# Patient Record
Sex: MALE | Race: WHITE | Employment: OTHER | RURAL
[De-identification: names, ages, dates, MRNs, and addresses within clinical notes are randomized per-mention and may not be internally consistent; named-entity substitution may affect disease eponyms.]

---

## 2019-10-23 ENCOUNTER — OFFICE VISIT (OUTPATIENT)
Dept: SURGERY | Age: 84
End: 2019-10-23

## 2019-10-23 VITALS
DIASTOLIC BLOOD PRESSURE: 83 MMHG | BODY MASS INDEX: 26.24 KG/M2 | SYSTOLIC BLOOD PRESSURE: 154 MMHG | WEIGHT: 183.3 LBS | RESPIRATION RATE: 18 BRPM | HEIGHT: 70 IN | TEMPERATURE: 98.1 F | HEART RATE: 71 BPM | OXYGEN SATURATION: 98 %

## 2019-10-23 DIAGNOSIS — R13.19 ESOPHAGEAL DYSPHAGIA: Primary | ICD-10-CM

## 2019-10-23 RX ORDER — LOSARTAN POTASSIUM 50 MG/1
50 TABLET ORAL 2 TIMES DAILY
Refills: 3 | COMMUNITY
Start: 2019-09-14

## 2019-10-23 RX ORDER — SOLIFENACIN SUCCINATE 5 MG/1
5 TABLET, FILM COATED ORAL DAILY
COMMUNITY

## 2019-10-23 NOTE — PROGRESS NOTES
Linette Lal is a 81 y.o. male who presents today with the following:  Chief Complaint   Patient presents with    Dysphagia       HPI    55-year-old male who presents to us as a referral by Dr. Kuldeep Flynn for episode of dysphagia that occurred approximately 3 to 4 weeks ago. He states he was eating a salad and ate half a sliced tomato that he believes was an immature aroma tomato. It apparently got stuck down low in his esophagus. He ended up coughing up food and the episode lasted for slightly prolonged period of time but then resolved. He has had no further episodes of dysphagia since then. Any episodes of dysphagia prior to this. He occasionally has reflux but no active reflux over a significant period of time and is not on any medications for this. He stopped smoking 30 years ago. He does drink a few glasses of wine every day. He has had no unexpected weight loss. He has no abdominal pain. He is a retired . No past medical history on file. Past Surgical History:   Procedure Laterality Date    HX HERNIA REPAIR         Social History     Socioeconomic History    Marital status:      Spouse name: Not on file    Number of children: Not on file    Years of education: Not on file    Highest education level: Not on file   Occupational History    Not on file   Social Needs    Financial resource strain: Not on file    Food insecurity:     Worry: Not on file     Inability: Not on file    Transportation needs:     Medical: Not on file     Non-medical: Not on file   Tobacco Use    Smoking status: Former Smoker    Smokeless tobacco: Never Used   Substance and Sexual Activity    Alcohol use:  Yes    Drug use: Not on file    Sexual activity: Not on file   Lifestyle    Physical activity:     Days per week: Not on file     Minutes per session: Not on file    Stress: Not on file   Relationships    Social connections:     Talks on phone: Not on file     Gets together: Not on file Attends Yarsani service: Not on file     Active member of club or organization: Not on file     Attends meetings of clubs or organizations: Not on file     Relationship status: Not on file    Intimate partner violence:     Fear of current or ex partner: Not on file     Emotionally abused: Not on file     Physically abused: Not on file     Forced sexual activity: Not on file   Other Topics Concern    Not on file   Social History Narrative    Not on file       Family History   Problem Relation Age of Onset    Cancer Father        No Known Allergies    Current Outpatient Medications   Medication Sig    losartan (COZAAR) 50 mg tablet Take 50 mg by mouth two (2) times a day.  silodosin (RAPAFLO PO) Take  by mouth.  solifenacin (VESICARE) 5 mg tablet Take 5 mg by mouth daily.  vit C/E/Zn/coppr/lutein/zeaxan (PRESERVISION AREDS-2 PO) Take  by mouth. No current facility-administered medications for this visit. The above histories, medications and allergies have been reviewed. Review of Systems   HENT: Positive for congestion and hearing loss. Genitourinary: Positive for urgency. Skin: Positive for rash. Neurological: Positive for dizziness. Visit Vitals  /83 (BP 1 Location: Left arm, BP Patient Position: Sitting)   Pulse 71   Temp 98.1 °F (36.7 °C) (Oral)   Resp 18   Ht 5' 10\" (1.778 m)   Wt 183 lb 4.8 oz (83.1 kg)   SpO2 98%   BMI 26.30 kg/m²     Physical Exam   Constitutional: He is well-developed, well-nourished, and in no distress. Cardiovascular: Normal rate, regular rhythm and normal heart sounds. Pulmonary/Chest: Effort normal and breath sounds normal. No respiratory distress. Abdominal: Soft. He exhibits no distension and no mass. There is no tenderness. There is no rebound and no guarding. 1. Esophageal dysphagia  He has had a single episode which has resolved. We discussed the options. I do think he would benefit from further evaluation.   I have offered both upper GI and barium swallow versus EGD to start with. Benefits and risks of both procedures were explained as well as limitations. He has elected to start with the upper GI which I think is reasonable. We will see him back after this exam in the proceed. Follow-up and Dispositions    · Return for after testing.          Alexandria Kline MD

## 2019-10-23 NOTE — PROGRESS NOTES
1. Have you been to the ER, urgent care clinic since your last visit? Hospitalized since your last visit? No    2. Have you seen or consulted any other health care providers outside of the [address] [place] [address] since your last visit? Include any pap smears or colon screening.  Yes When: new pt, [facility] intern[facility]

## 2019-10-23 NOTE — PATIENT INSTRUCTIONS
Learning About the Diet for Swallowing Problems  What are swallowing problems? Difficulty swallowing is also called dysphagia (say \"jqa-SHZ-syb-uh\"). It is most often a sign of a problem with your throat or esophagus. This is the tube that moves food and liquids from the back of your mouth to your stomach. Trouble swallowing can occur when the muscles and nerves that move food through the throat and esophagus are not working right. To help you swallow food, your doctor or speech therapist may advise a special dysphagia diet for you. Why is a special diet important? A dysphagia diet can help you handle some problems that can occur when it's hard to swallow food and liquids easily. These problems can include:  · Malnutrition. This means you aren't getting enough healthy foods to keep your body working well. · Dehydration. This means you aren't getting enough liquids to keep your body healthy. · Aspiration. This means that food, liquid, or saliva goes down your windpipe (trachea) into your lungs, instead of down your esophagus to your stomach. This can lead to aspiration pneumonia, which is an inflammation of the lungs. What is the dysphagia diet? In the dysphagia diet, you change the foods you eat and the liquids you drink to make it easier to swallow them. You may:  · Change the texture of the foods you eat. Your doctor or speech therapist may advise you to eat one of these types of foods:  ? Solid, soft foods that have been cut up into small pieces. Extra gravy or sauce can help make these foods easier to swallow. ? Semi-solid foods, like ground meats or fruits and vegetables that are mashed with a fork. These foods require some chewing. Extra gravy or sauce is often served. ? Foods that are the texture of pudding. You don't have to chew these foods. Examples include mashed potatoes with gravy; yogurt; pudding; and pureed meats, fruits, and vegetables. · Thicken the liquids you drink.  Your doctor or speech therapist will tell you what kind of thickener to use and how thick to make the liquids. ? Nectar-thick liquids leave a thin coating when they are poured from a spoon. ? Honey-thick liquids drip slowly when they are poured from a spoon. ? Pudding-thick liquids do not pour from a spoon. Your speech therapist will help you learn exercises to train your muscles to work together so you can swallow. You may also need to learn how to position your body or how to put food in your mouth to be able to swallow better. Some people have severe trouble swallowing and can't get enough food and liquids. In those cases, the doctor can insert a feeding tube so the person gets enough nutrients. Follow-up care is a key part of your treatment and safety. Be sure to make and go to all appointments, and call your doctor if you are having problems. It's also a good idea to know your test results and keep a list of the medicines you take. Where can you learn more? Go to http://yoly-mckenna.info/. Enter A794 in the search box to learn more about \"Learning About the Diet for Swallowing Problems. \"  Current as of: June 26, 2019  Content Version: 12.2  © 6863-8922 [employer]. Care instructions adapted under license by [employer] (which disclaims liability or warranty for this information). If you have questions about a medical condition or this instruction, always ask your healthcare professional. [place] [address] any warranty or liability for your use of this information.

## 2019-11-13 ENCOUNTER — OFFICE VISIT (OUTPATIENT)
Dept: SURGERY | Age: 84
End: 2019-11-13

## 2019-11-13 VITALS
OXYGEN SATURATION: 99 % | RESPIRATION RATE: 16 BRPM | SYSTOLIC BLOOD PRESSURE: 122 MMHG | HEIGHT: 70 IN | DIASTOLIC BLOOD PRESSURE: 80 MMHG | BODY MASS INDEX: 26.2 KG/M2 | WEIGHT: 183 LBS | HEART RATE: 76 BPM

## 2019-11-13 DIAGNOSIS — R13.19 ESOPHAGEAL DYSPHAGIA: Primary | ICD-10-CM

## 2019-11-13 NOTE — PROGRESS NOTES
Rebecca Blanco is a 81 y.o. male who presents today with the following:  Chief Complaint   Patient presents with    Results       HPI    He presents in follow-up after his upper GI. He has been doing well and has had no further episodes of problems. His upper GI showed the presence of a sliding hiatal hernia and no evidence of stricture or mucosal abnormality or mass lesion or peptic ulcer disease. He has not been on any medication for GI symptoms. History reviewed. No pertinent past medical history. Past Surgical History:   Procedure Laterality Date    HX HERNIA REPAIR         Social History     Socioeconomic History    Marital status:      Spouse name: Not on file    Number of children: Not on file    Years of education: Not on file    Highest education level: Not on file   Occupational History    Not on file   Social Needs    Financial resource strain: Not on file    Food insecurity:     Worry: Not on file     Inability: Not on file    Transportation needs:     Medical: Not on file     Non-medical: Not on file   Tobacco Use    Smoking status: Former Smoker    Smokeless tobacco: Never Used   Substance and Sexual Activity    Alcohol use:  Yes    Drug use: Not on file    Sexual activity: Not on file   Lifestyle    Physical activity:     Days per week: Not on file     Minutes per session: Not on file    Stress: Not on file   Relationships    Social connections:     Talks on phone: Not on file     Gets together: Not on file     Attends Jainism service: Not on file     Active member of club or organization: Not on file     Attends meetings of clubs or organizations: Not on file     Relationship status: Not on file    Intimate partner violence:     Fear of current or ex partner: Not on file     Emotionally abused: Not on file     Physically abused: Not on file     Forced sexual activity: Not on file   Other Topics Concern    Not on file   Social History Narrative    Not on file Family History   Problem Relation Age of Onset    Cancer Father        No Known Allergies    Current Outpatient Medications   Medication Sig    losartan (COZAAR) 50 mg tablet Take 50 mg by mouth two (2) times a day.  silodosin (RAPAFLO PO) Take  by mouth.  solifenacin (VESICARE) 5 mg tablet Take 5 mg by mouth daily.  vit C/E/Zn/coppr/lutein/zeaxan (PRESERVISION AREDS-2 PO) Take  by mouth. No current facility-administered medications for this visit. The above histories, medications and allergies have been reviewed. ROS    Visit Vitals  /80 (BP 1 Location: Left arm, BP Patient Position: Sitting)   Pulse 76   Resp 16   Ht 5' 10\" (1.778 m)   Wt 183 lb (83 kg)   SpO2 99%   BMI 26.26 kg/m²     Physical Exam   Constitutional: He is well-developed, well-nourished, and in no distress. Upper GI series with KUB dated 11/4/2019     Total number of images obtained: 14.     Total fluoroscopy time: 0.6 minutes.     Radiation dose: 25.21 mGy     The preliminary  film was unremarkable.     The patient drank Citrocarbonate and barium suspension without difficulty. The  esophagus demonstrated normal motility with no evidence of mucosal abnormality,  stricture or mass lesion. A sliding hiatal hernia was noted. No gastroesophageal  reflux was demonstrated at the time of this examination. The stomach revealed no  evidence of mass or ulcer. The duodenal bulb distended normally and exhibited no  evidence of peptic ulcer disease. The duodenum and visualized proximal small  bowel are unremarkable.     IMPRESSION  IMPRESSION:  1. Presence of a sliding hiatal hernia. No gastroesophageal reflux demonstrated  at the time of this examination. 2. No evidence of active peptic ulcer disease. 1. Esophageal dysphagia  Isolated episode of dysphagia. No significant pathology seen on upper GI and the patient has been completely asymptomatic. No further work-up needed at this point.   Follow-up if any symptoms recur. Follow-up and Dispositions    · Return for for any new problems.          Josafat Stinson MD

## 2019-11-13 NOTE — PATIENT INSTRUCTIONS
Hiatal Hernia: Care Instructions  Your Care Instructions  A hiatal hernia occurs when part of the stomach bulges into the chest cavity. A hiatal hernia may allow stomach acid and juices to back up into the esophagus (acid reflux). This can cause a feeling of burning, warmth, heat, or pain behind the breastbone. This feeling may often occur after you eat, soon after you lie down, or when you bend forward, and it may come and go. You also may have a sour taste in your mouth. These symptoms are commonly known as heartburn or reflux. But not all hiatal hernias cause symptoms. Follow-up care is a key part of your treatment and safety. Be sure to make and go to all appointments, and call your doctor if you are having problems. It's also a good idea to know your test results and keep a list of the medicines you take. How can you care for yourself at home? · Take your medicines exactly as prescribed. Call your doctor if you think you are having a problem with your medicine. · Do not take aspirin or other nonsteroidal anti-inflammatory drugs (NSAIDs), such as ibuprofen (Advil, Motrin) or naproxen (Aleve), unless your doctor says it is okay. Ask your doctor what you can take for pain. · Your doctor may recommend over-the-counter medicine. For mild or occasional indigestion, antacids such as Tums, Gaviscon, Maalox, or Mylanta may help. Your doctor also may recommend over-the-counter acid reducers, such as famotidine (Pepcid AC), cimetidine (Tagamet HB), ranitidine (Zantac 75 and Zantac 150), or omeprazole (Prilosec). Read and follow all instructions on the label. If you use these medicines often, talk with your doctor. · Change your eating habits. ? It's best to eat several small meals instead of two or three large meals. ? After you eat, wait 2 to 3 hours before you lie down. Late-night snacks aren't a good idea. ? Chocolate, mint, and alcohol can make heartburn worse.  They relax the valve between the esophagus and the stomach. ? Spicy foods, foods that have a lot of acid (like tomatoes and oranges), and coffee can make heartburn symptoms worse in some people. If your symptoms are worse after you eat a certain food, you may want to stop eating that food to see if your symptoms get better. · Do not smoke or chew tobacco.  · If you get heartburn at night, raise the head of your bed 6 to 8 inches by putting the frame on blocks or placing a foam wedge under the head of your mattress. (Adding extra pillows does not work.)  · Do not wear tight clothing around your middle. · Lose weight if you need to. Losing just 5 to 10 pounds can help. When should you call for help? Call your doctor now or seek immediate medical care if:    · You have new or worse belly pain.     · You are vomiting.    Watch closely for changes in your health, and be sure to contact your doctor if:    · You have new or worse symptoms of indigestion.     · You have trouble or pain swallowing.     · You are losing weight.     · You do not get better as expected. Where can you learn more? Go to http://yoly-mckenna.info/. Enter B608 in the search box to learn more about \"Hiatal Hernia: Care Instructions. \"  Current as of: November 7, 2018  Content Version: 12.2  © 9894-7607 [employer]. Care instructions adapted under license by [employer] (which disclaims liability or warranty for this information). If you have questions about a medical condition or this instruction, always ask your healthcare professional. [address] any warranty or liability for your use of this information.

## 2019-11-13 NOTE — PROGRESS NOTES
1. Have you been to the ER, urgent care clinic since your last visit? Hospitalized since your last visit? No    2. Have you seen or consulted any other health care providers outside of the [address] [place] [address] since your last visit? Include any pap smears or colon screening.  No

## 2020-07-27 ENCOUNTER — OFFICE VISIT (OUTPATIENT)
Dept: SURGERY | Age: 85
End: 2020-07-27

## 2020-07-27 VITALS
SYSTOLIC BLOOD PRESSURE: 165 MMHG | TEMPERATURE: 98.9 F | BODY MASS INDEX: 26.2 KG/M2 | DIASTOLIC BLOOD PRESSURE: 85 MMHG | HEIGHT: 70 IN | WEIGHT: 183 LBS | HEART RATE: 73 BPM

## 2020-07-27 DIAGNOSIS — R13.10 DYSPHAGIA, UNSPECIFIED TYPE: Primary | ICD-10-CM

## 2020-07-27 NOTE — PROGRESS NOTES
1. Have you been to the ER, urgent care clinic since your last visit? Hospitalized since your last visit? No    2. Have you seen or consulted any other health care providers outside of the [address] [place] [address] since your last visit? Include any pap smears or colon screening.  No

## 2020-07-27 NOTE — PROGRESS NOTES
En Yee is a 81 y.o. male who presents today with the following:  No chief complaint on file. HPI    25-year-old male who is known to me who had presented for dysphagia back he had November and had an upper GI which showed a sliding hiatal hernia and no active peptic ulcer disease with no evidence of mucosal abnormality or stricture or mass of the esophagus. He states that he has been having some worsening swallowing the pills was seen by his primary care physician Dr. Sepideh Lemons and referred over for possible EGD to evaluate for eosinophilic esophagitis. He states that he occasionally feels that food particles get stuck in his throat that he needs to eat a piece of bread to clear out and the most significant issue is with pills. No past medical history on file. Past Surgical History:   Procedure Laterality Date    HX HERNIA REPAIR         Social History     Socioeconomic History    Marital status:      Spouse name: Not on file    Number of children: Not on file    Years of education: Not on file    Highest education level: Not on file   Occupational History    Not on file   Social Needs    Financial resource strain: Not on file    Food insecurity     Worry: Not on file     Inability: Not on file    Transportation needs     Medical: Not on file     Non-medical: Not on file   Tobacco Use    Smoking status: Former Smoker    Smokeless tobacco: Never Used   Substance and Sexual Activity    Alcohol use:  Yes    Drug use: Not on file    Sexual activity: Not on file   Lifestyle    Physical activity     Days per week: Not on file     Minutes per session: Not on file    Stress: Not on file   Relationships    Social connections     Talks on phone: Not on file     Gets together: Not on file     Attends Latter day service: Not on file     Active member of club or organization: Not on file     Attends meetings of clubs or organizations: Not on file     Relationship status: Not on file   Maricarmen Coburn Intimate partner violence     Fear of current or ex partner: Not on file     Emotionally abused: Not on file     Physically abused: Not on file     Forced sexual activity: Not on file   Other Topics Concern    Not on file   Social History Narrative    Not on file       Family History   Problem Relation Age of Onset    Cancer Father        No Known Allergies    Current Outpatient Medications   Medication Sig    losartan (COZAAR) 50 mg tablet Take 50 mg by mouth two (2) times a day.  silodosin (RAPAFLO PO) Take  by mouth.  solifenacin (VESICARE) 5 mg tablet Take 5 mg by mouth daily.  vit C/E/Zn/coppr/lutein/zeaxan (PRESERVISION AREDS-2 PO) Take  by mouth. No current facility-administered medications for this visit. The above histories, medications and allergies have been reviewed. ROS    Visit Vitals  /85 (BP 1 Location: Left arm, BP Patient Position: Sitting)   Pulse 73   Temp 98.9 °F (37.2 °C) (Temporal)   Ht 5' 10\" (1.778 m)   Wt 183 lb (83 kg)   BMI 26.26 kg/m²     Physical Exam  Cardiovascular:      Rate and Rhythm: Normal rate and regular rhythm. Pulses: Normal pulses. Heart sounds: Normal heart sounds. Pulmonary:      Effort: Pulmonary effort is normal.      Breath sounds: Normal breath sounds. Abdominal:      General: There is no distension. Palpations: Abdomen is soft. There is no mass. Tenderness: There is no abdominal tenderness. Neurological:      Mental Status: He is alert. 1. Dysphagia, unspecified type  I offered him EGD because I think it is reasonable to evaluate for eosinophilic esophagitis. He is hesitant to proceed with this in light of the current Covid 19 crisis as well as the fact that he feels that his symptoms are not significantly severe. He is aware that without doing an EGD we cannot tell him if this disease process is present.   We have agreed that if his symptoms become worse or if the COVID-19 situation stabilizes he would call to schedule for EGD. Follow-up PRN. Follow-up and Dispositions    · Return if symptoms worsen or fail to improve.          Anny Gillis MD

## 2020-07-27 NOTE — PATIENT INSTRUCTIONS
Learning About the Diet for Swallowing Problems  What are swallowing problems? Difficulty swallowing is also called dysphagia (say \"egc-LIY-gwz-uh\"). It is most often a sign of a problem with your throat or esophagus. This is the tube that moves food and liquids from the back of your mouth to your stomach. Trouble swallowing can occur when the muscles and nerves that move food through the throat and esophagus are not working right. To help you swallow food, your doctor or speech therapist may advise a special dysphagia diet for you. Why is a special diet important? A dysphagia diet can help you handle some problems that can occur when it's hard to swallow food and liquids easily. These problems can include:  · Malnutrition. This means you aren't getting enough healthy foods to keep your body working well. · Dehydration. This means you aren't getting enough liquids to keep your body healthy. · Aspiration. This means that food, liquid, or saliva goes down your windpipe (trachea) into your lungs, instead of down your esophagus to your stomach. This can lead to aspiration pneumonia, which is an inflammation of the lungs. What is the dysphagia diet? In the dysphagia diet, you change the foods you eat and the liquids you drink to make it easier to swallow them. You may:  · Change the texture of the foods you eat. Your doctor or speech therapist may advise you to eat one of these types of foods:  ? Solid, soft foods that have been cut up into small pieces. Extra gravy or sauce can help make these foods easier to swallow. ? Semi-solid foods, like ground meats or fruits and vegetables that are mashed with a fork. These foods require some chewing. Extra gravy or sauce is often served. ? Foods that are the texture of pudding. You don't have to chew these foods. Examples include mashed potatoes with gravy; yogurt; pudding; and pureed meats, fruits, and vegetables. · Thicken the liquids you drink.  Your doctor or speech therapist will tell you what kind of thickener to use and how thick to make the liquids. ? Nectar-thick liquids leave a thin coating when they are poured from a spoon. ? Honey-thick liquids drip slowly when they are poured from a spoon. ? Pudding-thick liquids do not pour from a spoon. Your speech therapist will help you learn exercises to train your muscles to work together so you can swallow. You may also need to learn how to position your body or how to put food in your mouth to be able to swallow better. Some people have severe trouble swallowing and can't get enough food and liquids. In those cases, the doctor can insert a feeding tube so the person gets enough nutrients. Follow-up care is a key part of your treatment and safety. Be sure to make and go to all appointments, and call your doctor if you are having problems. It's also a good idea to know your test results and keep a list of the medicines you take. Where can you learn more? Go to http://yoly-mckenna.info/  Enter J838 in the search box to learn more about \"Learning About the Diet for Swallowing Problems. \"  Current as of: June 26, 2019               Content Version: 12.5  © 0290-0600 [employer]. Care instructions adapted under license by [employer] (which disclaims liability or warranty for this information). If you have questions about a medical condition or this instruction, always ask your healthcare professional. [place] [address] any warranty or liability for your use of this information.

## 2020-08-05 ENCOUNTER — OFFICE VISIT (OUTPATIENT)
Dept: SURGERY | Age: 85
End: 2020-08-05

## 2020-08-05 VITALS
HEIGHT: 70 IN | BODY MASS INDEX: 26.2 KG/M2 | OXYGEN SATURATION: 99 % | DIASTOLIC BLOOD PRESSURE: 65 MMHG | TEMPERATURE: 96.9 F | HEART RATE: 50 BPM | RESPIRATION RATE: 16 BRPM | SYSTOLIC BLOOD PRESSURE: 153 MMHG | WEIGHT: 183 LBS

## 2020-08-05 DIAGNOSIS — R13.19 ESOPHAGEAL DYSPHAGIA: Primary | ICD-10-CM

## 2020-08-05 NOTE — PROGRESS NOTES
1. Have you been to the ER, urgent care clinic since your last visit? Hospitalized since your last visit? No    2. Have you seen or consulted any other health care providers outside of the [address] [place] [address] since your last visit? Include any pap smears or colon screening.  No

## 2020-08-05 NOTE — PROGRESS NOTES
Carl Silva is a 81 y.o. male who presents today with the following:  Chief Complaint   Patient presents with    Follow-up       HPI    60-year-old male who is known to me who had presented for dysphagia back he had November and had an upper GI which showed a sliding hiatal hernia and no active peptic ulcer disease with no evidence of mucosal abnormality or stricture or mass of the esophagus. He states that he has been having some worsening swallowing the pills was seen by his primary care physician Dr. Yevgeniy Fuchs and referred over for possible EGD to evaluate for eosinophilic esophagitis. He states that he occasionally feels that food particles get stuck in his throat that he needs to eat a piece of bread to clear out and the most significant issue is with pills. Since last seen he has had an additional episode after eating roast beef that resulted in nausea and vomiting and difficulty swallowing and he is at this point prepared to proceed with EGD. History reviewed. No pertinent past medical history. Past Surgical History:   Procedure Laterality Date    HX HERNIA REPAIR         Social History     Socioeconomic History    Marital status:      Spouse name: Not on file    Number of children: Not on file    Years of education: Not on file    Highest education level: Not on file   Occupational History    Not on file   Social Needs    Financial resource strain: Not on file    Food insecurity     Worry: Not on file     Inability: Not on file    Transportation needs     Medical: Not on file     Non-medical: Not on file   Tobacco Use    Smoking status: Former Smoker    Smokeless tobacco: Never Used   Substance and Sexual Activity    Alcohol use:  Yes    Drug use: Not on file    Sexual activity: Not on file   Lifestyle    Physical activity     Days per week: Not on file     Minutes per session: Not on file    Stress: Not on file   Relationships    Social connections     Talks on phone: Not on file     Gets together: Not on file     Attends Advent service: Not on file     Active member of club or organization: Not on file     Attends meetings of clubs or organizations: Not on file     Relationship status: Not on file    Intimate partner violence     Fear of current or ex partner: Not on file     Emotionally abused: Not on file     Physically abused: Not on file     Forced sexual activity: Not on file   Other Topics Concern    Not on file   Social History Narrative    Not on file       Family History   Problem Relation Age of Onset    Cancer Father        No Known Allergies    Current Outpatient Medications   Medication Sig    losartan (COZAAR) 50 mg tablet Take 50 mg by mouth two (2) times a day.  silodosin (RAPAFLO PO) Take  by mouth.  solifenacin (VESICARE) 5 mg tablet Take 5 mg by mouth daily.  vit C/E/Zn/coppr/lutein/zeaxan (PRESERVISION AREDS-2 PO) Take  by mouth. No current facility-administered medications for this visit. The above histories, medications and allergies have been reviewed. ROS    Visit Vitals  /65 (BP 1 Location: Left arm, BP Patient Position: Sitting)   Pulse (!) 50   Temp 96.9 °F (36.1 °C) (Temporal)   Resp 16   Ht 5' 10\" (1.778 m)   Wt 183 lb (83 kg)   SpO2 99%   BMI 26.26 kg/m²     Physical Exam  Constitutional:       Appearance: Normal appearance. Cardiovascular:      Rate and Rhythm: Normal rate and regular rhythm. Pulmonary:      Effort: Pulmonary effort is normal.      Breath sounds: Normal breath sounds. Abdominal:      General: There is no distension. Palpations: Abdomen is soft. There is no mass. Tenderness: There is no abdominal tenderness. Neurological:      Mental Status: He is alert. 1. Esophageal dysphagia  Recommend EGD. Risks of the procedure were shared with the patient including the risks of aspiration or esophageal or gastric perforation. All questions were answered to the patient's satisfaction. We will schedule. The patient was counseled at length about the risks of carter Covid-19 during their perioperative period and any recovery window from their procedure. The patient was made aware that carter Covid-19  may worsen their prognosis for recovering from their procedure and lend to a higher morbidity and/or mortality risk. All material risks, benefits, and reasonable alternatives including postponing the procedure were discussed. The patient does  wish to proceed with the procedure at this time. Follow-up and Dispositions    · Return for post procedure.          Sofya Spivey MD

## 2020-08-25 ENCOUNTER — OFFICE VISIT (OUTPATIENT)
Dept: SURGERY | Age: 85
End: 2020-08-25

## 2020-08-25 VITALS
WEIGHT: 183 LBS | DIASTOLIC BLOOD PRESSURE: 98 MMHG | SYSTOLIC BLOOD PRESSURE: 148 MMHG | TEMPERATURE: 97.1 F | HEIGHT: 70 IN | HEART RATE: 70 BPM | BODY MASS INDEX: 26.2 KG/M2

## 2020-08-25 DIAGNOSIS — C15.9 MALIGNANT NEOPLASM OF ESOPHAGUS, UNSPECIFIED LOCATION (HCC): Primary | ICD-10-CM

## 2020-08-25 NOTE — LETTER
8/25/2020 11:07 AM 
 
Patient:  Abbie Fitzpatrick YOB: 1935 Date of Visit: 8/25/2020 Dear Katia Jimenez MD 
[place] [id number] VIA Facsimile: [phone]: Thank you for referring Mr. Abbie Fitzpatrick to me for evaluation/treatment. Below are the relevant portions of my assessment and plan of care. As we had discussed by phone he had underwent EGD on August 10. Biopsy did show a squamous cell carcinoma and an ulcer at 29 cm from the incisor. I saw him back in the office today and shared these findings with him and his spouse. He is agreeable to further work-up by cardiothoracic surgery and we will arrange for this referral. 
 
 
 
If you have questions, please do not hesitate to call me. I look forward to following Mr. Fiordaliza Chan along with you.  
 
 
 
Sincerely, 
 
 
Mateo Esteves MD

## 2020-08-25 NOTE — PROGRESS NOTES
[address] [facility]      Clinic Note - Follow up    Subjective     Kei Baldwin returns for scheduled follow up today. He is status post EGD performed back on August 10. He was found to have an ulcer at approximately 29 cm. This measured approximately 2 to 3 cm. Biopsies of this lesion showed this to be a squamous cell carcinoma which was moderately differentiated. We shared these findings with him and his spouse today in the office. Objective     Visit Vitals  BP (!) 148/98 (BP 1 Location: Left arm, BP Patient Position: Sitting)   Pulse 70   Temp 97.1 °F (36.2 °C) (Temporal)   Ht 5' 10\" (1.778 m)   Wt 183 lb (83 kg)   BMI 26.26 kg/m²         PE  GEN - Awake, alert, communicating appropriately. NAD    Labs     FINAL PATHOLOGIC DIAGNOSIS   Esophagus, esophageal ulcer, biopsy:   Positive for malignancy. Invasive moderately differentiated squamous cell carcinoma. Assessment     Kei Baldwin is a 81 y. o.yr old male with biopsy-proven squamous cell carcinoma at approximately 29 cm from the incisors. Plan     Had a lengthy discussion with the patient. He agrees for further evaluation and potential treatment. We will arrange for referral to cardiothoracic surgery.     Anny Gillis MD    CC: Dr. Agustin Jimenez

## 2020-08-25 NOTE — PATIENT INSTRUCTIONS
Learning About Esophageal Cancer  What is it? Esophageal cancer is the growth of abnormal cells in your esophagus. That's the hollow tube that connects your throat to your stomach. The cancer most often starts in the cells that line the inside of the esophagus. What are the symptoms? Symptoms of this type of cancer can include:  · Trouble swallowing. · Feeling like there's something stuck in your throat. · Pain when you swallow. · Weight loss. · Pain behind your breastbone (sternum). · Hoarseness and coughing. · Stomach upset (indigestion) and heartburn. How is it diagnosed? Your doctor will ask you questions about your and your family's past health. He or she will do a physical exam.  Your doctor likely will do an endoscopy. This is a test that lets your doctor look at the inside of your esophagus. The doctor uses a thin, lighted tube (endoscope) that bends. He or she uses it to look at your stomach and the first part of your small intestine. The doctor can also use the scope to take a sample of tissue for study (a biopsy). You may need more tests. These tests may include a CT scan, a PET scan, or an ultrasound of your esophagus. How is it treated? Treatment for esophageal cancer is based on the stage of the cancer and other things, such as your overall health. The main treatment options include:  Endoscopic treatment. For early cancer, the doctor may use an endoscope to guide tools down your throat. The tools are used to remove or destroy the cancer. Surgery. For cancer deeper in the tissue, you may have surgery to remove part of the esophagus. Radiation therapy. This uses high-dose X-rays to destroy cancer cells and shrink tumors. Chemotherapy. These medicines kill fast-growing cells, including cancer cells and some normal cells. Chemotherapy and radiation may be given together. This is called chemoradiation.   Your doctor will talk with you about your options and then make a treatment plan. What are some other things to think about? · Some tumors are aggressive and need treatment right away. But most cancer grows slowly enough that you can take a little time to find out more about your cancer as you decide about treatment. · Think about getting a second opinion from another doctor. Before you start major treatment, it's a good idea to check with another doctor about the type of cancer you have and the stage of your cancer. Your doctor or insurance company can recommend someone for a second opinion. · Ask any questions you might have. You can talk to your doctor, nurses, counselors, and other advisors. · Talk to family, friends, and supporters. Get the kinds of help you need. Follow-up care is a key part of your treatment and safety. Be sure to make and go to all appointments, and call your doctor if you are having problems. It's also a good idea to know your test results and keep a list of the medicines you take. Where can you learn more? Go to http://yoly-mckenna.info/  Enter E145 in the search box to learn more about \"Learning About Esophageal Cancer. \"  Current as of: August 22, 2019               Content Version: 12.5  © 8086-7436 [employer]. Care instructions adapted under license by [employer] (which disclaims liability or warranty for this information). If you have questions about a medical condition or this instruction, always ask your healthcare professional. [address] any warranty or liability for your use of this information.

## 2020-08-25 NOTE — LETTER
8/25/2020 11:07 AM 
 
Patient:  Sindhu Schafer YOB: 1935 Date of Visit: 8/25/2020 Dear No Recipients: Thank you for referring Mr. Sindhu Schafer to me for evaluation/treatment. Below are the relevant portions of my assessment and plan of care. If you have questions, please do not hesitate to call me. I look forward to following Mr. Tara Swartz along with you.  
 
 
 
Sincerely, 
 
 
Giovani Garcia MD

## 2020-08-25 NOTE — LETTER
8/25/2020 11:06 AM 
 
Patient:  Denise Auguste YOB: 1935 Date of Visit: 8/25/2020 Dear No Recipients: Thank you for referring . Denise Auguste to me for evaluation/treatment. Below are the relevant portions of my assessment and plan of care. His EGD on August 8 showed a moderately differentiated squamous cell carcinoma at 29 cm from the incisors. We saw him back in the office today and shared these findings with him and his spouse. He is agreeable for further work-up and evaluation by cardiothoracic surgery and we will arrange for the referral. 
 
 
 
If you have questions, please do not hesitate to call me. I look forward to following Mr. Terrell Garcia along with you.  
 
 
 
Sincerely, 
 
 
Eloy Ray MD

## 2020-08-25 NOTE — LETTER
8/25/2020 11:04 AM 
 
Patient:  Kei Baldwin YOB: 1935 Date of Visit: 8/25/2020 Dear No Recipients: Thank you for referring Mr. Kei Baldwin to me for evaluation/treatment. Below are the relevant portions of my assessment and plan of care. He has been having some intermittent dysphagia which has been most pronounced to pills. He underwent EGD back on August 8 and was found to have an ulcer at approximately 29 cm from the incisors. This was biopsy-proven moderately differentiated squamous cell carcinoma as we had discussed. I saw him back in the office today. He is agreeable to having further evaluation and work-up with cardiothoracic surgery and we will arrange for referral. 
 
 
 
If you have questions, please do not hesitate to call me. I look forward to following Mr. Trev Hunt along with you.  
 
 
 
Sincerely, 
 
 
Anny Gillis MD

## 2020-08-25 NOTE — PROGRESS NOTES
1. Have you been to the ER, urgent care clinic since your last visit? Hospitalized since your last visit? No    2. Have you seen or consulted any other health care providers outside of the [address] [place] [address] since your last visit? Include any pap smears or colon screening.  No

## 2020-08-26 ENCOUNTER — TELEPHONE (OUTPATIENT)
Dept: SURGERY | Age: 85
End: 2020-08-26

## 2020-08-26 ENCOUNTER — DOCUMENTATION ONLY (OUTPATIENT)
Dept: SURGERY | Age: 85
End: 2020-08-26

## 2020-08-26 DIAGNOSIS — C15.9 MALIGNANT NEOPLASM OF ESOPHAGUS, UNSPECIFIED LOCATION (HCC): Primary | ICD-10-CM

## 2020-08-26 NOTE — TELEPHONE ENCOUNTER
Dr. Catherine Hook calling to speak with Dr. Juliocesar Raman on Ct orders they would like to get of the patient of neck, chest, pelvis with contrast.    Also, patient would like to get them done at [address] [place] [address].

## 2020-08-26 NOTE — PROGRESS NOTES
Scheduled patient to see dr. Bryan Somers on 9/2 @ 1:20. Arrive at 1 through the main entrance of the hospital ([facility]. [place] [address]).

## 2020-08-26 NOTE — TELEPHONE ENCOUNTER
Returned call to Vick Escobar states patient will be new patient for Dr. Sheila Cote next patient wanted to make sure it would be ok for them to proceed with CT prior to appointment. Thefelicia Seen informed per Dr. Sheila Cote ok to proceed with scan. Thera Seen instructed to have patient bring report and CD to visit once completed. She will return call if any other questions or concerns.

## 2022-06-09 NOTE — LETTER
11/13/2019 1:35 PM 
 
Patient:  Florian Link YOB: 1935 Date of Visit: 11/13/2019 Dear Dwayne Perea MD 
[facility] 
[place] [id number] VIA Facsimile: [phone] 
 : Thank you for referring Mr. Florian Link to me for evaluation/treatment. Below are the relevant portions of my assessment and plan of care. As you recall, he had a single episode of dysphagia after eating a tomato. We discussed the options when he arrived at the office including EGD versus upper GI. We elected to proceed with imaging. This showed a sliding hiatal hernia but no evidence of stricture or mucosal abnormality or peptic ulcer disease. I saw him back in follow-up in the office today and he relates that he has had no episodes of dysphagia or other GI symptomatology since last seen. He denies any significant reflux and has not had to take any GI medications. I believe at this point we can just observe. If he develops any symptomatology we can discuss EGD. All of the above was shared with him and he is in agreement. If you have questions, please do not hesitate to call me. I look forward to following Mr. Nat Hicks along with you.  
 
 
 
Sincerely, 
 
 
Tali Doll MD 
 

No